# Patient Record
Sex: MALE | Race: WHITE | NOT HISPANIC OR LATINO | Employment: FULL TIME | URBAN - NONMETROPOLITAN AREA
[De-identification: names, ages, dates, MRNs, and addresses within clinical notes are randomized per-mention and may not be internally consistent; named-entity substitution may affect disease eponyms.]

---

## 2018-08-26 ENCOUNTER — APPOINTMENT (OUTPATIENT)
Dept: ULTRASOUND IMAGING | Facility: HOSPITAL | Age: 43
End: 2018-08-26

## 2018-08-26 ENCOUNTER — HOSPITAL ENCOUNTER (EMERGENCY)
Facility: HOSPITAL | Age: 43
Discharge: HOME | End: 2018-08-26
Attending: EMERGENCY MEDICINE | Admitting: EMERGENCY MEDICINE

## 2018-08-26 VITALS
HEIGHT: 72 IN | DIASTOLIC BLOOD PRESSURE: 68 MMHG | TEMPERATURE: 98.4 F | OXYGEN SATURATION: 97 % | BODY MASS INDEX: 26.41 KG/M2 | WEIGHT: 195 LBS | SYSTOLIC BLOOD PRESSURE: 118 MMHG | HEART RATE: 90 BPM | RESPIRATION RATE: 18 BRPM

## 2018-08-26 DIAGNOSIS — L03.115 CELLULITIS AND ABSCESS OF RIGHT LOWER EXTREMITY: Primary | ICD-10-CM

## 2018-08-26 DIAGNOSIS — L02.415 CELLULITIS AND ABSCESS OF RIGHT LOWER EXTREMITY: Primary | ICD-10-CM

## 2018-08-26 LAB
ALBUMIN SERPL-MCNC: 4 G/DL (ref 3.5–5)
ALBUMIN/GLOB SERPL: 1.7 G/DL (ref 1.5–2.5)
ALP SERPL-CCNC: 89 U/L (ref 40–129)
ALT SERPL W P-5'-P-CCNC: 24 U/L (ref 10–44)
ANION GAP SERPL CALCULATED.3IONS-SCNC: 4.4 MMOL/L (ref 3.6–11.2)
APTT PPP: 28 SECONDS (ref 23.8–36.1)
AST SERPL-CCNC: 20 U/L (ref 10–34)
BACTERIA UR QL AUTO: ABNORMAL /HPF
BASOPHILS # BLD AUTO: 0.04 10*3/MM3 (ref 0–0.3)
BASOPHILS NFR BLD AUTO: 0.4 % (ref 0–2)
BILIRUB SERPL-MCNC: 0.4 MG/DL (ref 0.2–1.8)
BILIRUB UR QL STRIP: NEGATIVE
BUN BLD-MCNC: 14 MG/DL (ref 7–21)
BUN/CREAT SERPL: 16.3 (ref 7–25)
CALCIUM SPEC-SCNC: 9.3 MG/DL (ref 7.7–10)
CHLORIDE SERPL-SCNC: 110 MMOL/L (ref 99–112)
CK SERPL-CCNC: 73 U/L (ref 24–204)
CLARITY UR: CLEAR
CO2 SERPL-SCNC: 26.6 MMOL/L (ref 24.3–31.9)
COLOR UR: YELLOW
CREAT BLD-MCNC: 0.86 MG/DL (ref 0.43–1.29)
CRP SERPL-MCNC: 2.46 MG/DL (ref 0–0.99)
D-LACTATE SERPL-SCNC: 0.9 MMOL/L (ref 0.5–2)
DEPRECATED RDW RBC AUTO: 46.7 FL (ref 37–54)
EOSINOPHIL # BLD AUTO: 0.41 10*3/MM3 (ref 0–0.7)
EOSINOPHIL NFR BLD AUTO: 4 % (ref 0–5)
ERYTHROCYTE [DISTWIDTH] IN BLOOD BY AUTOMATED COUNT: 14.6 % (ref 11.5–14.5)
GFR SERPL CREATININE-BSD FRML MDRD: 97 ML/MIN/1.73
GLOBULIN UR ELPH-MCNC: 2.3 GM/DL
GLUCOSE BLD-MCNC: 114 MG/DL (ref 70–110)
GLUCOSE UR STRIP-MCNC: NEGATIVE MG/DL
HCT VFR BLD AUTO: 46.1 % (ref 42–52)
HGB BLD-MCNC: 15.2 G/DL (ref 14–18)
HGB UR QL STRIP.AUTO: ABNORMAL
HYALINE CASTS UR QL AUTO: ABNORMAL /LPF
IMM GRANULOCYTES # BLD: 0.01 10*3/MM3 (ref 0–0.03)
IMM GRANULOCYTES NFR BLD: 0.1 % (ref 0–0.5)
INR PPP: 0.97 (ref 0.9–1.1)
KETONES UR QL STRIP: ABNORMAL
LEUKOCYTE ESTERASE UR QL STRIP.AUTO: NEGATIVE
LYMPHOCYTES # BLD AUTO: 2.15 10*3/MM3 (ref 1–3)
LYMPHOCYTES NFR BLD AUTO: 21.2 % (ref 21–51)
MCH RBC QN AUTO: 28.7 PG (ref 27–33)
MCHC RBC AUTO-ENTMCNC: 33 G/DL (ref 33–37)
MCV RBC AUTO: 87.1 FL (ref 80–94)
MONOCYTES # BLD AUTO: 1 10*3/MM3 (ref 0.1–0.9)
MONOCYTES NFR BLD AUTO: 9.9 % (ref 0–10)
NEUTROPHILS # BLD AUTO: 6.53 10*3/MM3 (ref 1.4–6.5)
NEUTROPHILS NFR BLD AUTO: 64.4 % (ref 30–70)
NITRITE UR QL STRIP: NEGATIVE
OSMOLALITY SERPL CALC.SUM OF ELEC: 282.6 MOSM/KG (ref 273–305)
PH UR STRIP.AUTO: 5.5 [PH] (ref 5–8)
PLATELET # BLD AUTO: 268 10*3/MM3 (ref 130–400)
PMV BLD AUTO: 9.4 FL (ref 6–10)
POTASSIUM BLD-SCNC: 4 MMOL/L (ref 3.5–5.3)
PROT SERPL-MCNC: 6.3 G/DL (ref 6–8)
PROT UR QL STRIP: NEGATIVE
PROTHROMBIN TIME: 13.1 SECONDS (ref 11–15.4)
RBC # BLD AUTO: 5.29 10*6/MM3 (ref 4.7–6.1)
RBC # UR: ABNORMAL /HPF
REF LAB TEST METHOD: ABNORMAL
SODIUM BLD-SCNC: 141 MMOL/L (ref 135–153)
SP GR UR STRIP: >1.03 (ref 1–1.03)
SQUAMOUS #/AREA URNS HPF: ABNORMAL /HPF
UROBILINOGEN UR QL STRIP: ABNORMAL
WBC NRBC COR # BLD: 10.14 10*3/MM3 (ref 4.5–12.5)
WBC UR QL AUTO: ABNORMAL /HPF

## 2018-08-26 PROCEDURE — 36415 COLL VENOUS BLD VENIPUNCTURE: CPT

## 2018-08-26 PROCEDURE — 81001 URINALYSIS AUTO W/SCOPE: CPT | Performed by: EMERGENCY MEDICINE

## 2018-08-26 PROCEDURE — 86140 C-REACTIVE PROTEIN: CPT | Performed by: EMERGENCY MEDICINE

## 2018-08-26 PROCEDURE — 85730 THROMBOPLASTIN TIME PARTIAL: CPT | Performed by: EMERGENCY MEDICINE

## 2018-08-26 PROCEDURE — 93971 EXTREMITY STUDY: CPT

## 2018-08-26 PROCEDURE — 99283 EMERGENCY DEPT VISIT LOW MDM: CPT

## 2018-08-26 PROCEDURE — 80053 COMPREHEN METABOLIC PANEL: CPT | Performed by: EMERGENCY MEDICINE

## 2018-08-26 PROCEDURE — 93971 EXTREMITY STUDY: CPT | Performed by: RADIOLOGY

## 2018-08-26 PROCEDURE — 82550 ASSAY OF CK (CPK): CPT | Performed by: EMERGENCY MEDICINE

## 2018-08-26 PROCEDURE — 83605 ASSAY OF LACTIC ACID: CPT | Performed by: EMERGENCY MEDICINE

## 2018-08-26 PROCEDURE — 87040 BLOOD CULTURE FOR BACTERIA: CPT | Performed by: EMERGENCY MEDICINE

## 2018-08-26 PROCEDURE — 85025 COMPLETE CBC W/AUTO DIFF WBC: CPT | Performed by: EMERGENCY MEDICINE

## 2018-08-26 PROCEDURE — 85610 PROTHROMBIN TIME: CPT | Performed by: EMERGENCY MEDICINE

## 2018-08-26 RX ORDER — SULFAMETHOXAZOLE AND TRIMETHOPRIM 800; 160 MG/1; MG/1
1 TABLET ORAL 2 TIMES DAILY
Qty: 20 TABLET | Refills: 0 | Status: SHIPPED | OUTPATIENT
Start: 2018-08-26 | End: 2018-09-02

## 2018-08-26 RX ORDER — CEPHALEXIN 500 MG/1
500 CAPSULE ORAL 4 TIMES DAILY
Qty: 40 CAPSULE | Refills: 0 | Status: SHIPPED | OUTPATIENT
Start: 2018-08-26 | End: 2021-06-09

## 2018-08-26 RX ORDER — SODIUM CHLORIDE 0.9 % (FLUSH) 0.9 %
10 SYRINGE (ML) INJECTION AS NEEDED
Status: DISCONTINUED | OUTPATIENT
Start: 2018-08-26 | End: 2018-08-26 | Stop reason: HOSPADM

## 2018-08-31 LAB
BACTERIA SPEC AEROBE CULT: NORMAL
BACTERIA SPEC AEROBE CULT: NORMAL

## 2019-09-23 ENCOUNTER — OFFICE VISIT (OUTPATIENT)
Dept: NEUROSURGERY | Facility: CLINIC | Age: 44
End: 2019-09-23

## 2019-09-23 VITALS — HEIGHT: 72 IN | BODY MASS INDEX: 25.33 KG/M2 | WEIGHT: 187 LBS

## 2019-09-23 DIAGNOSIS — M51.36 DEGENERATIVE DISC DISEASE, LUMBAR: Primary | ICD-10-CM

## 2019-09-23 PROCEDURE — 99203 OFFICE O/P NEW LOW 30 MIN: CPT | Performed by: NEUROLOGICAL SURGERY

## 2019-09-23 RX ORDER — HYDROCODONE BITARTRATE AND ACETAMINOPHEN 5; 325 MG/1; MG/1
1 TABLET ORAL 3 TIMES DAILY
Refills: 0 | COMMUNITY
Start: 2019-09-18 | End: 2021-07-20

## 2019-09-23 RX ORDER — PREDNISONE 10 MG/1
TABLET ORAL
COMMUNITY
Start: 2019-09-18 | End: 2021-06-09

## 2019-09-23 RX ORDER — METHOCARBAMOL 750 MG/1
750 TABLET, FILM COATED ORAL 3 TIMES DAILY PRN
Refills: 2 | COMMUNITY
Start: 2019-09-18

## 2019-09-23 NOTE — PATIENT INSTRUCTIONS
After receiving the injection    Call Dr. Lugo on a Monday or Tuesday with an update.      Ask for Poly () and leave a message for  Dr. Lugo.     He will call you back at the end of the day as soon as he can.     [phone]

## 2019-09-23 NOTE — PROGRESS NOTES
Eddi George  1975  [id number]      Chief Complaint   Patient presents with   • Back Pain       HISTORY OF PRESENT ILLNESS: This is a 44-year-old male with a genetic predisposition for degenerative osteoarthritis presenting with a protracted history of intermittent low back pain which is been present for 1 year and has progressed.  He has been to chiropractic without help.  Unfortunately he is intolerant to NSAIDs because of a Márquez's esophagitis and upper GI bleeding.  He has finished a course of prednisone which has been somewhat helpful.  Lumbar MRI is been performed is referred for neurosurgical consultation.  The pain is worse in his left lower extremity than the right.  He does not have claudication, however.    He has issues with his right shoulder that is in the process of evaluation.    No past medical history on file.    Past Surgical History:   Procedure Laterality Date   • CHOLECYSTECTOMY         No family history on file.    Social History     Socioeconomic History   • Marital status:      Spouse name: Not on file   • Number of children: Not on file   • Years of education: Not on file   • Highest education level: Not on file   Tobacco Use   • Smoking status: Current Every Day Smoker       No Known Allergies      Current Outpatient Medications:   •  HYDROcodone-acetaminophen (NORCO) 7.5-325 MG per tablet, Take 1 tablet by mouth 2 (Two) Times a Day As Needed., Disp: , Rfl: 0  •  methocarbamol (ROBAXIN) 750 MG tablet, Take 750 mg by mouth 3 (Three) Times a Day As Needed., Disp: , Rfl: 2  •  predniSONE (DELTASONE) 10 MG tablet, , Disp: , Rfl:   •  cephalexin (KEFLEX) 500 MG capsule, Take 1 capsule by mouth 4 (Four) Times a Day., Disp: 40 capsule, Rfl: 0    Review of Systems   Constitutional: Negative for activity change, appetite change, chills, diaphoresis, fatigue, fever and unexpected weight change.   HENT: Negative for congestion, dental problem, drooling, ear discharge, ear pain,  "facial swelling, hearing loss, mouth sores, nosebleeds, postnasal drip, rhinorrhea, sinus pressure, sneezing, sore throat, tinnitus, trouble swallowing and voice change.    Eyes: Negative for photophobia, pain, discharge, redness, itching and visual disturbance.   Respiratory: Positive for cough and wheezing. Negative for apnea, choking, chest tightness, shortness of breath and stridor.    Cardiovascular: Negative for chest pain, palpitations and leg swelling.   Gastrointestinal: Negative for abdominal distention, abdominal pain, anal bleeding, blood in stool, constipation, diarrhea, nausea, rectal pain and vomiting.   Endocrine: Negative for cold intolerance, heat intolerance, polydipsia, polyphagia and polyuria.   Genitourinary: Negative for decreased urine volume, difficulty urinating, dysuria, enuresis, flank pain, frequency, genital sores, hematuria and urgency.   Musculoskeletal: Positive for arthralgias and back pain. Negative for gait problem, joint swelling, myalgias, neck pain and neck stiffness.   Skin: Negative for color change, pallor, rash and wound.   Allergic/Immunologic: Negative for environmental allergies, food allergies and immunocompromised state.   Neurological: Negative for dizziness, tremors, seizures, syncope, facial asymmetry, speech difficulty, weakness, light-headedness, numbness and headaches.   Hematological: Negative for adenopathy. Does not bruise/bleed easily.   Psychiatric/Behavioral: Negative for agitation, behavioral problems, confusion, decreased concentration, dysphoric mood, hallucinations, self-injury, sleep disturbance and suicidal ideas. The patient is nervous/anxious. The patient is not hyperactive.        Vitals:    09/23/19 0920   Weight: 84.8 kg (187 lb)   Height: 182.9 cm (72\")       Neurological Examination:    Mental status/speech: The patient is alert and oriented.  Speech is clear without aphysia or dysarthria.  No overt cognitive deficits.    Cranial nerve " examination:    Olfaction: Smell is intact.  Vision: Vision is intact without visual field abnormalities.  Funduscopic examination is normal.  No pupillary irregularity.  Ocular motor examination: The extraocular muscles are intact.  There is no diplopia.  The pupil is round and reactive to both light and accommodation.  There is no nystagmus.  Facial movement/sensation: There is no facial weakness.  Sensation is intact in the first, second, and third divisions of the trigeminal nerve.  The corneal reflex is intact.  Auditory: Hearing is intact to finger rub bilaterally.  Cranial nerves IX, X, XI, XII: Phonation is normal.  No dysphagia.  Tongue is protruded in the midline without atrophy.  The gag reflex is intact.  Shoulder shrug is normal.    Musculoligamentous ligamentous examination: He has limitation of range of motion lumbar spine.  Straight leg raising, Lasègue and flip test are negative.  The deep tendon reflexes are hypoactive yet elicitable.  I am unable to find evidence of weakness, sensory loss or reflex asymmetry.  His gait is normal.    Medical Decision Making:     Diagnostic Data Set: The lumbar MRI data set shows diffuse and advanced degenerative osteoarthritic changes throughout the entire lumbar spine.  He has Modic changes quite severe at L5-S1 but also present at L4-L5.  He does not have high-grade spinal or lateral recess stenosis.  No evidence of instability.      Assessment: Symptomatic diffuse advanced degenerative osteoarthritis of the lumbar spine          Recommendations: Unfortunately he does not have a focal area that would lend itself to surgical intervention.  Therefore, surgery is not a therapeutic option at this time.  I have suggested an epidural steroid injection.  Also, I have given him a prescription for a TENS unit which may be of some help.  I have made an appoint for him to see Dr. Jimenez.  He will call me subsequently.  Unfortunately he has a genetic predisposition which  may well worsen with time.  He is extremely straightforward and honest and certainly has a significant anatomical abnormality for which there is no good solution.        I greatly appreciate the opportunity to see and evaluate this individual.  If you have questions or concerns regarding issues that I may have overlooked please call me at any time: [phone].  Jamil Lugo M.D.  [facility]  [address] [address]  Scribed for Thony Lugo MD by Janes Ledezma CMA. 9/23/2019  9:57 AM    I have read and concur with the information provided by the scribe.  Thony Lugo MD

## 2021-01-18 ENCOUNTER — HOSPITAL ENCOUNTER (OUTPATIENT)
Dept: GENERAL RADIOLOGY | Facility: HOSPITAL | Age: 46
Discharge: HOME | End: 2021-01-18

## 2021-01-18 ENCOUNTER — TRANSCRIBE ORDERS (OUTPATIENT)
Dept: LAB | Facility: HOSPITAL | Age: 46
End: 2021-01-18

## 2021-01-18 DIAGNOSIS — M25.572 PAIN IN LEFT ANKLE AND JOINTS OF LEFT FOOT: ICD-10-CM

## 2021-01-18 DIAGNOSIS — M25.572 PAIN IN LEFT ANKLE AND JOINTS OF LEFT FOOT: Primary | ICD-10-CM

## 2021-01-18 DIAGNOSIS — M25.562 LEFT KNEE PAIN, UNSPECIFIED CHRONICITY: ICD-10-CM

## 2021-01-18 PROCEDURE — 73610 X-RAY EXAM OF ANKLE: CPT | Performed by: RADIOLOGY

## 2021-01-18 PROCEDURE — 73610 X-RAY EXAM OF ANKLE: CPT

## 2021-01-18 PROCEDURE — 73630 X-RAY EXAM OF FOOT: CPT

## 2021-01-18 PROCEDURE — 73630 X-RAY EXAM OF FOOT: CPT | Performed by: RADIOLOGY

## 2021-01-18 PROCEDURE — 73564 X-RAY EXAM KNEE 4 OR MORE: CPT

## 2021-01-18 PROCEDURE — 73564 X-RAY EXAM KNEE 4 OR MORE: CPT | Performed by: RADIOLOGY

## 2021-04-23 ENCOUNTER — TELEPHONE (OUTPATIENT)
Dept: NEUROSURGERY | Facility: CLINIC | Age: 46
End: 2021-04-23

## 2021-04-23 NOTE — TELEPHONE ENCOUNTER
Caller: Leena George    Relationship to patient: Emergency Contact    Best call back number: [phone]    Chief complaint: BACK PAIN    Type of visit:FOLLOW UP    Requested date:ASAP    If rescheduling, when is the original appointment:NA    Additional notes:PTS WIFE CALLED AND STATED THAT HER  HAS SEEN  IN 2019. PTS WIFE STATED HER  HAD A EPIDURAL INJECTION AND IT DID NOT HELP. PTS WIFE IS ASKING FOR THE PT TO BE SEEN BY . I ADVISED THE PTS WIFE THAT WE DO NOT SWITCH PROVIDERS. PT HAS SEEN ANOTHER NS SINCE SEEING . PT WAS SEEN BY  IN [place]. PT HAS HAD RECENT MRI IN [place] 01/21 BUT NOT IN CHART. WILL HAVE TO CALL FOR IMAGING REPORT  UNSURE OF HOW TO SCHEDULE IF POSSIBLE. PLEASE ADVISE THANK YOU!

## 2021-04-23 NOTE — TELEPHONE ENCOUNTER
Route this back once the records from Dr. Ross's office have been obtained. Will have Dr. Curyr review records and advise on how to schedule appointment.

## 2021-04-23 NOTE — TELEPHONE ENCOUNTER
CALLED OFFICE TO REQUEST MR FROM 'S OFFICE. NO ANSWER AND LEFT VM FOR THEM TO CALL BACK SO WE COULD GET THE MR FAXED TO US. [phone]  OFFICE. ALSO NEED MRI REPORT IF THEY HAVE IT. PTS WIFE WAS UNSURE OF WHERE HE HAD HIS MRI

## 2021-04-28 NOTE — TELEPHONE ENCOUNTER
FABRICIO WITH DR. SELE[facility] KENDALL OFFICE RETURNED CALL REGUARDING MEDICAL RECORDS.  FABRICIO STATED WE NEEDED TO FAX A REQUEST TO [phone].  FAXED REQUEST TO OFFICE AND IT WAS DELIVERED AT 3:13PM

## 2021-05-03 NOTE — TELEPHONE ENCOUNTER
PATIENT CALLING FOR AN UPDATE - INFORMED HIM WE ARE STILL WAITING ON MEDICAL RECORDS FROM DR. DURAN OFF SO THAT DR. GALLOWAY CAN REVIEW THEM AND ADVISE US HOW TO PROCEED.

## 2021-05-10 NOTE — TELEPHONE ENCOUNTER
PT CALLED BACK TO CHECK ON HIS RECORDS THAT WERE SENT FROM DR.EL KENDALL OFFICE [phone]. A MEDICAL RECORDS REQUEST WAS FAXED ON 04/28/21. I WILL CALL THE OFFICE AGAIN TO FIND OUT ABOUT THE RECORDS. CALLED AND WAS UNABLE TO LEAVE MESSAGE

## 2021-05-13 NOTE — TELEPHONE ENCOUNTER
Caller: ISMAEL    Relationship: PCP    Best call back number: [phone]    What is the best time to reach you:     Who are you requesting to speak with (clinical staff, provider,  specific staff member): KANU    Do you know the name of the person who called: N/A    What was the call regarding: PATIENT CALLED TO CHECK THE STATUS OF SCHEDULING. PREV PATIENT OF [facility] REQUESTING TO SEE DR. GALLOWAY. PATIENT INDICATES THAT A REFERRAL WAS FAXED ON 05/10/21. PATIENT ALSO MENTION COMP MRI JAN 2021 [facility]. [facility] CONTACTED PCP & CONFIRMED REF. REQUESTED MED REC TO BE REFAXED TO [facility]. UNABLE TO COMPLETE REQUEST AT THIS TIME. PLEASE ADVISE HOW PROVIDER WOULD LIKE TO PROCEED?    Do you require a callback: YES

## 2021-05-14 NOTE — TELEPHONE ENCOUNTER
Caller: Eddi George    Relationship: Self    Best call back number: [phone]    What is the best time to reach you:     Who are you requesting to speak with (clinical staff, provider,  specific staff member): N/A    Do you know the name of the person who called: N/A    What was the call regarding: PATIENT CALLED AGAIN TO CHECK THE STATUS OF SCHEDULING & WAS UPDATED. [facility] LOCATED MED REC IN ONBASE & RECORDS LOADED TO CHART. REFERRAL CREATED FOR FURTHER REVIEW.

## 2021-06-01 ENCOUNTER — HOSPITAL ENCOUNTER (OUTPATIENT)
Dept: GENERAL RADIOLOGY | Facility: HOSPITAL | Age: 46
Discharge: HOME | End: 2021-06-01

## 2021-06-01 ENCOUNTER — TRANSCRIBE ORDERS (OUTPATIENT)
Dept: LAB | Facility: HOSPITAL | Age: 46
End: 2021-06-01

## 2021-06-01 DIAGNOSIS — M54.6 PAIN IN THORACIC SPINE: ICD-10-CM

## 2021-06-01 DIAGNOSIS — M54.2 CERVICALGIA: ICD-10-CM

## 2021-06-01 DIAGNOSIS — M54.2 CERVICALGIA: Primary | ICD-10-CM

## 2021-06-01 PROCEDURE — 72072 X-RAY EXAM THORAC SPINE 3VWS: CPT

## 2021-06-01 PROCEDURE — 72050 X-RAY EXAM NECK SPINE 4/5VWS: CPT | Performed by: RADIOLOGY

## 2021-06-01 PROCEDURE — 72072 X-RAY EXAM THORAC SPINE 3VWS: CPT | Performed by: RADIOLOGY

## 2021-06-01 PROCEDURE — 72050 X-RAY EXAM NECK SPINE 4/5VWS: CPT

## 2021-06-09 ENCOUNTER — OFFICE VISIT (OUTPATIENT)
Dept: NEUROSURGERY | Facility: CLINIC | Age: 46
End: 2021-06-09

## 2021-06-09 VITALS — BODY MASS INDEX: 26.71 KG/M2 | WEIGHT: 197.2 LBS | TEMPERATURE: 97.3 F | HEIGHT: 72 IN

## 2021-06-09 DIAGNOSIS — M51.36 DDD (DEGENERATIVE DISC DISEASE), LUMBAR: ICD-10-CM

## 2021-06-09 DIAGNOSIS — M43.16 SPONDYLOLISTHESIS OF LUMBAR REGION: ICD-10-CM

## 2021-06-09 DIAGNOSIS — M54.9 MECHANICAL BACK PAIN: Primary | ICD-10-CM

## 2021-06-09 DIAGNOSIS — M54.16 LUMBAR RADICULOPATHY: ICD-10-CM

## 2021-06-09 PROCEDURE — 99213 OFFICE O/P EST LOW 20 MIN: CPT | Performed by: NEUROLOGICAL SURGERY

## 2021-06-09 RX ORDER — TESTOSTERONE CYPIONATE 200 MG/ML
INJECTION, SOLUTION INTRAMUSCULAR
COMMUNITY
Start: 2021-06-01

## 2021-06-09 RX ORDER — TAMSULOSIN HYDROCHLORIDE 0.4 MG/1
CAPSULE ORAL
COMMUNITY
Start: 2021-03-02

## 2021-06-09 RX ORDER — SILDENAFIL CITRATE 20 MG/1
TABLET ORAL
COMMUNITY
Start: 2021-06-01

## 2021-06-09 NOTE — PROGRESS NOTES
Patient: Eddi George  : 1975    Primary Care Provider: Cierra Lou APRN    Requesting Provider: As above        History    Chief Complaint: Low back and bilateral lower extremity pain with sensory alteration.    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  His leg symptoms have a dysesthetic quality.  His symptoms are worse with bending, lifting, standing, walking, lying down.  There is no position that gives relief.  He also complains of some numbness and tingling in his hands.  He has undergone injections and therapy.  He has utilized a TENS unit.  He smokes 1 pack of tobacco daily.  He has no bowel or bladder dysfunction.    Review of Systems   Constitutional: Positive for activity change. Negative for appetite change, chills, diaphoresis, fatigue, fever and unexpected weight change.   HENT: Negative for congestion, dental problem, drooling, ear discharge, ear pain, facial swelling, hearing loss, mouth sores, nosebleeds, postnasal drip, rhinorrhea, sinus pressure, sinus pain, sneezing, sore throat, tinnitus, trouble swallowing and voice change.    Eyes: Negative for photophobia, pain, discharge, redness, itching and visual disturbance.   Respiratory: Negative for apnea, cough, choking, chest tightness, shortness of breath, wheezing and stridor.    Cardiovascular: Positive for leg swelling. Negative for chest pain and palpitations.   Gastrointestinal: Negative for abdominal distention, abdominal pain, anal bleeding, blood in stool, constipation, diarrhea, nausea, rectal pain and vomiting.   Endocrine: Negative for cold intolerance, heat intolerance, polydipsia, polyphagia and polyuria.   Genitourinary: Positive for difficulty urinating. Negative for decreased urine volume, dysuria, enuresis, flank pain, frequency, genital sores, hematuria  "and urgency.   Musculoskeletal: Positive for back pain, gait problem, joint swelling, neck pain and neck stiffness. Negative for arthralgias and myalgias.   Skin: Negative for color change, pallor, rash and wound.   Allergic/Immunologic: Positive for environmental allergies. Negative for food allergies and immunocompromised state.   Neurological: Negative for dizziness, tremors, seizures, syncope, facial asymmetry, speech difficulty, weakness, light-headedness, numbness and headaches.   Hematological: Negative for adenopathy. Does not bruise/bleed easily.   Psychiatric/Behavioral: Positive for sleep disturbance. Negative for agitation, behavioral problems, confusion, decreased concentration, dysphoric mood, hallucinations, self-injury and suicidal ideas. The patient is nervous/anxious. The patient is not hyperactive.    All other systems reviewed and are negative.      The patient's past medical history, past surgical history, family history, and social history have been reviewed at length in the electronic medical record.    Physical Exam:   Temp 97.3 °F (36.3 °C)   Ht 182.9 cm (72.01\")   Wt 89.4 kg (197 lb 3.2 oz)   BMI 26.74 kg/m²   CONSTITUTIONAL: Patient is well-nourished, pleasant and appears stated age.  CV: Heart regular rate and rhythm without murmur, rub, or gallop.  PULMONARY: Lungs are clear to ascultation.  MUSCULOSKELETAL:  Straight leg raising is negative.  Apolinar's Sign is negative.  ROM in the low back is limited in all directions.  Tenderness in the back to palpation is not observed.  There are color changes on the skin of his lower back that probably represent a thermal injury from his heating pad.  NEUROLOGICAL:  Orientation, memory, attention span, language function, and cognition have been examined and are intact.  Strength is intact in the lower extremities to direct testing.  Muscle tone is normal throughout.  Station and gait are normal.  Sensation is intact to light touch testing " throughout.  Deep tendon reflexes are 1+ and symmetrical.  Coordination is intact.      Medical Decision Making    Data Review:   (All imaging studies were personally reviewed unless stated otherwise)  MRI of the lumbar spine dated 1/22/2021 demonstrates diffuse degenerative disc disease and facet arthropathy.  There is broad-based disc-osteophyte at L4-5 that narrows the recesses.  There is a grade 1 listhesis of L5 on S1.  Bilateral pars defects are noted.  There are some chronic Modic endplate changes.    Diagnosis:   1.  Mechanical low back pain.  2.  L5-S1 spondylolisthesis with potential instability.  3.  Lumbar radiculopathy.  4.  Tobacco abuse.    Treatment Options:   I am going to set up a CT myelogram to better delineate potential sites of nerve root compromise and to assess for instability.  If he were to require surgical treatment with fusion and stabilization he would have to be a non-smoker.  He is currently off work.  His job requires him to be able to lift 20-40 pounds and perform repetitive bending and twisting.       Diagnosis Plan   1. Mechanical back pain     2. Spondylolisthesis of lumbar region  IR Myelogram Lumbar Spine   3. Lumbar radiculopathy     4. DDD (degenerative disc disease), lumbar         Scribed for Naif Curry MD by Marilu Alicia STEVEN 6/9/2021 14:16 EDT      I, Dr. Curry, personally performed the services described in the documentation, as scribed in my presence, and it is both accurate and complete.

## 2021-07-01 ENCOUNTER — TELEPHONE (OUTPATIENT)
Dept: INFUSION THERAPY | Facility: HOSPITAL | Age: 46
End: 2021-07-01

## 2021-07-01 NOTE — TELEPHONE ENCOUNTER
Attempted to contact as preprocedure call for scheduled myelogram on 7/6.  No answer either mobile or home number, will attempt to contact again tomorrow.

## 2021-07-06 ENCOUNTER — HOSPITAL ENCOUNTER (OUTPATIENT)
Dept: GENERAL RADIOLOGY | Facility: HOSPITAL | Age: 46
Discharge: HOME | End: 2021-07-06

## 2021-07-06 ENCOUNTER — HOSPITAL ENCOUNTER (OUTPATIENT)
Dept: CT IMAGING | Facility: HOSPITAL | Age: 46
Discharge: HOME | End: 2021-07-06

## 2021-07-06 VITALS
OXYGEN SATURATION: 98 % | HEART RATE: 80 BPM | RESPIRATION RATE: 16 BRPM | DIASTOLIC BLOOD PRESSURE: 75 MMHG | TEMPERATURE: 98.1 F | WEIGHT: 191 LBS | BODY MASS INDEX: 25.87 KG/M2 | HEIGHT: 72 IN | SYSTOLIC BLOOD PRESSURE: 110 MMHG

## 2021-07-06 DIAGNOSIS — M43.16 SPONDYLOLISTHESIS OF LUMBAR REGION: ICD-10-CM

## 2021-07-06 PROCEDURE — 72240 MYELOGRAPHY NECK SPINE: CPT

## 2021-07-06 PROCEDURE — 25010000003 LIDOCAINE 1 % SOLUTION: Performed by: NEUROLOGICAL SURGERY

## 2021-07-06 PROCEDURE — 0 IOPAMIDOL 41 % SOLUTION: Performed by: NEUROLOGICAL SURGERY

## 2021-07-06 PROCEDURE — 62304 MYELOGRAPHY LUMBAR INJECTION: CPT

## 2021-07-06 PROCEDURE — 62284 INJECTION FOR MYELOGRAM: CPT | Performed by: NEUROLOGICAL SURGERY

## 2021-07-06 PROCEDURE — 72120 X-RAY BEND ONLY L-S SPINE: CPT

## 2021-07-06 PROCEDURE — 72132 CT LUMBAR SPINE W/DYE: CPT

## 2021-07-06 RX ORDER — PROMETHAZINE HYDROCHLORIDE 25 MG/1
25 TABLET ORAL ONCE AS NEEDED
Status: DISCONTINUED | OUTPATIENT
Start: 2021-07-06 | End: 2021-07-07 | Stop reason: HOSPADM

## 2021-07-06 RX ORDER — LIDOCAINE HYDROCHLORIDE 10 MG/ML
10 INJECTION, SOLUTION INFILTRATION; PERINEURAL ONCE
Status: COMPLETED | OUTPATIENT
Start: 2021-07-06 | End: 2021-07-06

## 2021-07-06 RX ORDER — ONDANSETRON 4 MG/1
4 TABLET, FILM COATED ORAL ONCE AS NEEDED
Status: DISCONTINUED | OUTPATIENT
Start: 2021-07-06 | End: 2021-07-07 | Stop reason: HOSPADM

## 2021-07-06 RX ADMIN — LIDOCAINE HYDROCHLORIDE 10 ML: 10 INJECTION, SOLUTION INFILTRATION; PERINEURAL at 07:23

## 2021-07-06 RX ADMIN — IOPAMIDOL 20 ML: 408 INJECTION, SOLUTION INTRATHECAL at 07:22

## 2021-07-06 NOTE — POST-PROCEDURE NOTE
MYELOGRAM PROCEDURE NOTE  Neurosurgery    Patient: Eddi George  : 1975      PreOp Diagnosis: Lumbar radiculopathy    PostOp Diagnosis: Same    Procedure: Lumbar myelogram    Surgeon: Patricio    Anesthesia: 1% lidocaine    Technique:   Spinal needle: 22 gauge   Contrast: Isovue 200 (20ml)   Injection site: L2-3    EBL: Trace    Specimens removed: None    Complication: None        Naif Curry MD  21  07:13 EDT

## 2021-07-07 ENCOUNTER — TELEPHONE (OUTPATIENT)
Dept: INFUSION THERAPY | Facility: HOSPITAL | Age: 46
End: 2021-07-07

## 2021-07-09 ENCOUNTER — TELEPHONE (OUTPATIENT)
Dept: INFUSION THERAPY | Facility: HOSPITAL | Age: 46
End: 2021-07-09

## 2021-07-09 ENCOUNTER — TELEPHONE (OUTPATIENT)
Dept: NEUROSURGERY | Facility: CLINIC | Age: 46
End: 2021-07-09

## 2021-07-09 NOTE — TELEPHONE ENCOUNTER
Pt called to report back pain that is very painful. States headaches wax and wane and DO get better with rest. States he is hydrated. Dr Curry's office number given and patient encouraged to call and speak with MD. Called and left detailed message including call back number ([phone]) for patient.

## 2021-07-09 NOTE — TELEPHONE ENCOUNTER
Provider:  Patricio  Caller: pt  Time of call:   12:21  Phone #:  [phone]  Surgery:  na  Surgery Date:  na  Last visit:  6-9-21   Next visit: 7-20-21    AMBER:         Reason for call:    Pt having bad back pain and headaches after the myleo Tuesday. Thursday was his worse day he had been trying to mow the yard.    When did it start:7-7-21    Where is it located:back and around to side and headaches going to eyes  Forehead and to his neck.    How long has this been going on/is this new or the same as before surgery: 2 days    Characteristics of symptom/severity:back is heavy dull pain with bad headaches    Timing- Is it constant or intermittent: intermittent    What makes it worse: up moving around    What makes it better: when he lays down it gets better    What therapies/medications have you tried:ice and his PCP the dosage from 5-325 to 7.5-325 on his Norco. It did help but still having pain.

## 2021-07-09 NOTE — TELEPHONE ENCOUNTER
Please reassure patient, this will get better with time.  I would recommend over-the-counter analgesics, rest, and hydration.

## 2021-07-20 ENCOUNTER — OFFICE VISIT (OUTPATIENT)
Dept: NEUROSURGERY | Facility: CLINIC | Age: 46
End: 2021-07-20

## 2021-07-20 VITALS — HEIGHT: 72 IN | BODY MASS INDEX: 26.22 KG/M2 | TEMPERATURE: 97.3 F | WEIGHT: 193.6 LBS

## 2021-07-20 DIAGNOSIS — M51.36 DDD (DEGENERATIVE DISC DISEASE), LUMBAR: ICD-10-CM

## 2021-07-20 DIAGNOSIS — M54.16 LUMBAR RADICULOPATHY: ICD-10-CM

## 2021-07-20 DIAGNOSIS — M43.16 SPONDYLOLISTHESIS OF LUMBAR REGION: Primary | ICD-10-CM

## 2021-07-20 PROCEDURE — 99214 OFFICE O/P EST MOD 30 MIN: CPT | Performed by: NEUROLOGICAL SURGERY

## 2021-07-20 RX ORDER — SILDENAFIL 100 MG/1
100 TABLET, FILM COATED ORAL DAILY
COMMUNITY
Start: 2021-06-10 | End: 2021-07-20 | Stop reason: SDUPTHER

## 2021-07-20 RX ORDER — HYDROCODONE BITARTRATE AND ACETAMINOPHEN 7.5; 325 MG/1; MG/1
1 TABLET ORAL 3 TIMES DAILY
COMMUNITY
Start: 2021-07-07

## 2021-07-20 NOTE — PROGRESS NOTES
Patient: Eddi George  : 1975    Primary Care Provider: Cierra Lou APRN    Requesting Provider: As above        History    Chief Complaint: Low back and bilateral lower extremity pain with sensory alteration.    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  His leg symptoms have a dysesthetic quality.  His symptoms are worse with bending, lifting, standing, walking, lying down.  There is no position that gives relief.  He also complains of some numbness and tingling in his hands.  He has undergone injections and therapy.  He has utilized a TENS unit.  He smokes 1 pack of tobacco daily.  He has no bowel or bladder dysfunction.  He reports no change in his symptoms.    Review of Systems   Constitutional: Positive for activity change. Negative for appetite change, chills, diaphoresis, fatigue, fever and unexpected weight change.   HENT: Negative for congestion, dental problem, drooling, ear discharge, ear pain, facial swelling, hearing loss, mouth sores, nosebleeds, postnasal drip, rhinorrhea, sinus pressure, sinus pain, sneezing, sore throat, tinnitus, trouble swallowing and voice change.    Eyes: Negative for photophobia, pain, discharge, redness, itching and visual disturbance.   Respiratory: Negative for apnea, cough, choking, chest tightness, shortness of breath, wheezing and stridor.    Cardiovascular: Positive for leg swelling. Negative for chest pain and palpitations.   Gastrointestinal: Negative for abdominal distention, abdominal pain, anal bleeding, blood in stool, constipation, diarrhea, nausea, rectal pain and vomiting.   Endocrine: Negative for cold intolerance, heat intolerance, polydipsia, polyphagia and polyuria.   Genitourinary: Positive for difficulty urinating. Negative for decreased urine volume, dysuria, enuresis, flank  "pain, frequency, genital sores, hematuria and urgency.   Musculoskeletal: Positive for back pain, gait problem, joint swelling, neck pain and neck stiffness. Negative for arthralgias and myalgias.   Skin: Negative for color change, pallor, rash and wound.   Allergic/Immunologic: Positive for environmental allergies. Negative for food allergies and immunocompromised state.   Neurological: Negative for dizziness, tremors, seizures, syncope, facial asymmetry, speech difficulty, weakness, light-headedness, numbness and headaches.   Hematological: Negative for adenopathy. Does not bruise/bleed easily.   Psychiatric/Behavioral: Positive for sleep disturbance. Negative for agitation, behavioral problems, confusion, decreased concentration, dysphoric mood, hallucinations, self-injury and suicidal ideas. The patient is nervous/anxious. The patient is not hyperactive.    All other systems reviewed and are negative.      The patient's past medical history, past surgical history, family history, and social history have been reviewed at length in the electronic medical record.    Physical Exam:   Temp 97.3 °F (36.3 °C)   Ht 182.9 cm (72.01\")   Wt 87.8 kg (193 lb 9.6 oz)   BMI 26.25 kg/m²   MUSCULOSKELETAL:  Straight leg raising is negative.  Apolinar's Sign is negative.  Tenderness in the back to palpation is not observed.  NEUROLOGICAL:  Strength is intact in the lower extremities to direct testing.  Muscle tone is normal throughout.  Station and gait are normal.  Sensation is intact to light touch testing throughout.    Medical Decision Making    Data Review:   (All imaging studies were personally reviewed unless stated otherwise)  CT myelogram demonstrates diffuse degenerative change.  There is some broad-based disc bulging at L4-5.  I do not see significant translational movement at L5-S1 although there is disc base narrowing and biforaminal compromise.    MRI of the lumbar spine dated 1/22/2021 demonstrates diffuse " degenerative disc disease and facet arthropathy.  There is broad-based disc-osteophyte at L4-5 that narrows the recesses.  There is a grade 1 listhesis of L5 on S1.  Bilateral pars defects are noted.  There are some chronic Modic endplate changes.         Diagnosis:   1.  L5-S1 spondylolisthesis with bilateral L5 nerve root compromise at the foraminal level.  2.  Mechanical low back pain.  3.  Tobacco abuse.    Treatment Options:   The patient needs an L5-S1 fusion which will likely diminish his symptoms.  It is unlikely to completely eradicate his symptoms however I have explained what surgery would entail as well as the potential risks, complications, limitations.  I have also had a lengthy discussion about the necessity of smoking cessation.  He will follow-up in 2 months.  If he has been able to stop smoking and we can verify this with testing then we can go ahead and try and get his surgery arranged.  He has to be back to work by November or he will lose his job.  This may be problematic.  I have explained that he needs to be a non-smoker for 3 months to get the surgery approved by his insurance carrier.       Diagnosis Plan   1. Spondylolisthesis of lumbar region     2. Lumbar radiculopathy     3. DDD (degenerative disc disease), lumbar         Scribed for Naif Curry MD by Marilu Alicia STEVEN 7/20/2021 13:01 EDT      I, Dr. Curry, personally performed the services described in the documentation, as scribed in my presence, and it is both accurate and complete.

## 2021-07-21 ENCOUNTER — TELEPHONE (OUTPATIENT)
Dept: NEUROSURGERY | Facility: CLINIC | Age: 46
End: 2021-07-21

## 2021-07-21 NOTE — TELEPHONE ENCOUNTER
Provider: Patricio   Caller: Clay  Time of call:     Phone #:  [phone]  Surgery:  NA  Last visit:  Yesterday   Next visit: 9/21/21     Reason for call:       Clay with [employer] called in regard to pt.     Pt mentioned to his insurance company that he was looking to have back surgery but was told that insurance would not cover it if he is a smoker. Clay checked his plan/summary and their medical policy and did not see anything specifically stating that they would not cover surgery, however it does mention that smoking can reduce the rate of sx.   If surgery is needed she recommends we reach out to there authorization department.

## 2021-07-21 NOTE — TELEPHONE ENCOUNTER
Per Dr. Curry: Brenda, please work with insurance company to see what would actually be covered.     Per Dr. Curry: Patient still needs to stop smoking.

## 2021-07-22 ENCOUNTER — TELEPHONE (OUTPATIENT)
Dept: NEUROSURGERY | Facility: CLINIC | Age: 46
End: 2021-07-22

## 2021-07-22 NOTE — TELEPHONE ENCOUNTER
Patient LVM trying to return Marilu's call.     I attempted to contact patient to discuss PA's message but no answer. LVM to call back tomorrow. Will attempt to call pt back tomorrow.

## 2021-07-22 NOTE — TELEPHONE ENCOUNTER
PATIENT CALLED HIS INS AND WAS ADVISED THAT SMOKING WOULD NOT PREVENT HIM FROM HAVING SURGERY.  HE WANTS TO SCHEDULE HIS SURGERY.  PLEASE ADVISE    [phone]

## 2021-07-22 NOTE — TELEPHONE ENCOUNTER
"I called and talked to the patient.     Patient again stated he had called and talked with Insurance company, and they advised him they did not have a policy in place in regards to smoking and fusions. Patient stated he is afraid he is going to lose his job, as he is only allowed to be off work for 6 months.     Patient stated, \"I googled the smoking thing, and there is only a 5 % to 10 % difference in regards to smokers VS non smokers, and that is a chance I am willing to take.\"     Patient stated, \"Smoking is the only good thing I have going for me. Everything else has been taken away. I have cut back from over a pack a day, to only 5 a day.\"    Patient was advised Dr. Curry was in surgery today, that we would pass his message along, and call him sometime tomorrow to update him.   "

## 2021-07-22 NOTE — TELEPHONE ENCOUNTER
We are holding strong on the no smoking criteria.   - he may be willing to assume the risk, however Dr. Curry is not   - he needs to keep the planned 2 month FU and we will check labs to make sure he is smoke free

## 2021-07-23 NOTE — TELEPHONE ENCOUNTER
"Called and iterated that he would need to completely quit smoking before Dr. Curry would perform the surgery regardless that his insurance would cover the procedure as him being an active smoker. Patient is frustrated because he \"read on google that smoking only affects 10% of healing related to surgeries and is running out of time because he has to be back to work, recovered, by November the 4th.\" He also read 'all smoking related chemicals are out of your body in 10 days\"  Wanted to know if he could schedule surgery if he quit smoking for 10 days. I iterated to the patient that resuming smoking after surgery would/could effect his healing and that quitting for 10 days just to  his heavy smoking habit afterwards would be a waste of his 10 days of being smoker free. He then asked if another surgeon in the office could perform the surgery so that he could be recovered and back to work my November the 4th, and I let him know that all of the surgeons are in sync on the smoking guidelines.     Patient asked me to find out if he can schedule surgery and not smoke for 10 days leading up to surgery.   Please advise  "

## 2021-07-23 NOTE — TELEPHONE ENCOUNTER
"[facility]BA  Advised patient that he would need to be non-smoking for 3 months, he is livid. \"why did Dr. Curry lie to me and say it was an insurance issue then? I'll find another doctor because I have to get back to work.\"    Patient is going to contact us if he needs any records sent anywhere.   "

## 2021-07-23 NOTE — TELEPHONE ENCOUNTER
Patient called and said he is down to 3-4 cigarettes a day.    I reiterated Mady's message per Dr. Curry.    He said he spoke with his insurance company and they said it was okay for him to proceed.    (Patient said if he doesn't get back to work by November he will loose his job.)

## 2022-06-03 ENCOUNTER — TRANSCRIBE ORDERS (OUTPATIENT)
Dept: LAB | Facility: HOSPITAL | Age: 47
End: 2022-06-03

## 2022-06-03 ENCOUNTER — HOSPITAL ENCOUNTER (OUTPATIENT)
Dept: ULTRASOUND IMAGING | Facility: HOSPITAL | Age: 47
Discharge: HOME | End: 2022-06-03
Admitting: NURSE PRACTITIONER

## 2022-06-03 DIAGNOSIS — M79.604 PAIN OF RIGHT LEG: Primary | ICD-10-CM

## 2022-06-03 DIAGNOSIS — M79.604 PAIN OF RIGHT LEG: ICD-10-CM

## 2022-06-03 PROCEDURE — 93971 EXTREMITY STUDY: CPT

## 2022-06-03 PROCEDURE — 93971 EXTREMITY STUDY: CPT | Performed by: RADIOLOGY

## 2022-06-09 ENCOUNTER — APPOINTMENT (OUTPATIENT)
Dept: ULTRASOUND IMAGING | Facility: HOSPITAL | Age: 47
End: 2022-06-09

## 2022-07-01 ENCOUNTER — TRANSCRIBE ORDERS (OUTPATIENT)
Dept: LAB | Facility: HOSPITAL | Age: 47
End: 2022-07-01

## 2022-07-01 ENCOUNTER — HOSPITAL ENCOUNTER (OUTPATIENT)
Dept: GENERAL RADIOLOGY | Facility: HOSPITAL | Age: 47
Discharge: HOME | End: 2022-07-01
Admitting: NURSE PRACTITIONER

## 2022-07-01 DIAGNOSIS — M25.562 LEFT KNEE PAIN, UNSPECIFIED CHRONICITY: ICD-10-CM

## 2022-07-01 DIAGNOSIS — M25.562 LEFT KNEE PAIN, UNSPECIFIED CHRONICITY: Primary | ICD-10-CM

## 2022-07-01 PROCEDURE — 73564 X-RAY EXAM KNEE 4 OR MORE: CPT

## 2022-07-01 PROCEDURE — 73564 X-RAY EXAM KNEE 4 OR MORE: CPT | Performed by: RADIOLOGY
